# Patient Record
Sex: FEMALE | URBAN - METROPOLITAN AREA
[De-identification: names, ages, dates, MRNs, and addresses within clinical notes are randomized per-mention and may not be internally consistent; named-entity substitution may affect disease eponyms.]

---

## 2020-09-02 ENCOUNTER — OFFICE VISIT (OUTPATIENT)
Dept: URBAN - METROPOLITAN AREA CLINIC 76 | Facility: CLINIC | Age: 40
End: 2020-09-02
Payer: COMMERCIAL

## 2020-09-02 PROCEDURE — 92004 COMPRE OPH EXAM NEW PT 1/>: CPT | Performed by: OPTOMETRIST

## 2020-09-02 ASSESSMENT — KERATOMETRY
OS: 44.75
OD: 44.50

## 2020-09-02 ASSESSMENT — VISUAL ACUITY
OS: 20/30
OD: 20/30

## 2020-09-02 NOTE — IMPRESSION/PLAN
Impression: Myopia, bilateral: H52.13. Bilateral. Plan: A glasses prescription has been discussed and generated; optional.  Patient to call with any concerns.

## 2021-09-21 ENCOUNTER — OFFICE VISIT (OUTPATIENT)
Dept: URBAN - METROPOLITAN AREA CLINIC 76 | Facility: CLINIC | Age: 41
End: 2021-09-21
Payer: COMMERCIAL

## 2021-09-21 DIAGNOSIS — H52.13 MYOPIA, BILATERAL: Primary | ICD-10-CM

## 2021-09-21 PROCEDURE — 92014 COMPRE OPH EXAM EST PT 1/>: CPT | Performed by: OPTOMETRIST

## 2021-09-21 RX ORDER — PREDNISOLONE ACETATE 10 MG/ML
1 % SUSPENSION/ DROPS OPHTHALMIC
Qty: 5 | Refills: 0 | Status: ACTIVE
Start: 2021-09-21

## 2021-09-21 RX ORDER — PREDNISOLONE ACETATE 10 MG/ML
1 % SUSPENSION/ DROPS OPHTHALMIC
Qty: 5 | Refills: 0 | Status: INACTIVE
Start: 2021-09-21 | End: 2021-09-21

## 2021-09-21 ASSESSMENT — INTRAOCULAR PRESSURE: OD: 10

## 2021-09-21 ASSESSMENT — KERATOMETRY
OD: 45.00
OS: 44.63

## 2021-09-21 ASSESSMENT — VISUAL ACUITY
OD: 20/25
OS: 20/25

## 2021-09-21 NOTE — IMPRESSION/PLAN
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 Caregivers noticed red OD 1 month ago, pt did not c/o pain. IOP good OD. Plan: Discussed. Start Pred QID OD, instilled Phenylephrine 10% OD. Continue to monitor, pt/care giver to call with concerns. Discussed AACG symptoms. Call eye clinic ASAP, don't go to urgent care.

## 2021-09-21 NOTE — IMPRESSION/PLAN
Impression: Myopia, bilateral: H52.13. Happy with vision OU. Plan: A glasses prescription has been discussed and generated, pt does not wish to wear glasses. Patient to call with any concerns.

## 2021-09-29 ENCOUNTER — OFFICE VISIT (OUTPATIENT)
Dept: URBAN - METROPOLITAN AREA CLINIC 76 | Facility: CLINIC | Age: 41
End: 2021-09-29
Payer: COMMERCIAL

## 2021-09-29 DIAGNOSIS — H21.541: Primary | ICD-10-CM

## 2021-09-29 PROCEDURE — 99212 OFFICE O/P EST SF 10 MIN: CPT | Performed by: OPTOMETRIST

## 2021-09-29 ASSESSMENT — INTRAOCULAR PRESSURE
OD: 11
OS: 10

## 2021-09-29 NOTE — IMPRESSION/PLAN
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 Improved OD. IOP good OU. Plan: Discussed. Reduce Pred QID OD for 3 more days then d/c, Continue to monitor, pt/care giver to call with concerns. Discussed AACG symptoms. Call eye clinic ASAP, don't go to urgent care. No follow up needed unless symptoms worsen.

## 2022-11-01 ENCOUNTER — OFFICE VISIT (OUTPATIENT)
Dept: URBAN - METROPOLITAN AREA CLINIC 76 | Facility: CLINIC | Age: 42
End: 2022-11-01
Payer: COMMERCIAL

## 2022-11-01 DIAGNOSIS — H21.541: Primary | ICD-10-CM

## 2022-11-01 PROCEDURE — 92014 COMPRE OPH EXAM EST PT 1/>: CPT | Performed by: OPTOMETRIST

## 2022-11-01 ASSESSMENT — INTRAOCULAR PRESSURE
OS: 11
OD: 11

## 2022-11-01 NOTE — IMPRESSION/PLAN
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 Improved OD. IOP good OU. Plan: Continue to monitor, pt/care giver to call with concerns. Discussed AACG symptoms. Call eye clinic ASAP, don't go to urgent care. No follow up needed unless symptoms worsen. Use tropic and phenyl OD. Tropic only OS.

## 2022-12-07 ENCOUNTER — OFFICE VISIT (OUTPATIENT)
Dept: URBAN - METROPOLITAN AREA CLINIC 76 | Facility: CLINIC | Age: 42
End: 2022-12-07
Payer: COMMERCIAL

## 2022-12-07 DIAGNOSIS — H21.541: Primary | ICD-10-CM

## 2022-12-07 PROCEDURE — 99212 OFFICE O/P EST SF 10 MIN: CPT | Performed by: OPTOMETRIST

## 2022-12-07 ASSESSMENT — INTRAOCULAR PRESSURE
OD: 11
OS: 13

## 2022-12-07 NOTE — IMPRESSION/PLAN
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 Stable OD. IOP good OU. Mother wanted reassurance regarding condition, needed more explanation. Plan: Discussed condition in great detail with mother and caretaker. Continue to monitor, pt caregiver to call with concerns. Discussed AACG symptoms. Call eye clinic ASAP, don't go to urgent care. No follow up needed unless symptoms worsen. Advised pupils are different sizes, as expected. Use tropic and phenyl OD. Tropic only OS.

## 2023-11-07 ENCOUNTER — OFFICE VISIT (OUTPATIENT)
Dept: URBAN - METROPOLITAN AREA CLINIC 76 | Facility: CLINIC | Age: 43
End: 2023-11-07
Payer: COMMERCIAL

## 2023-11-07 DIAGNOSIS — H21.541: Primary | ICD-10-CM

## 2023-11-07 PROCEDURE — 92014 COMPRE OPH EXAM EST PT 1/>: CPT | Performed by: OPTOMETRIST

## 2023-11-07 ASSESSMENT — KERATOMETRY
OS: 44.75
OD: 44.63

## 2023-11-07 ASSESSMENT — INTRAOCULAR PRESSURE
OD: 10
OS: 12

## 2023-12-07 ENCOUNTER — OFFICE VISIT (OUTPATIENT)
Dept: URBAN - METROPOLITAN AREA CLINIC 76 | Facility: CLINIC | Age: 43
End: 2023-12-07
Payer: COMMERCIAL

## 2023-12-07 DIAGNOSIS — S05.00XA INJURY OF CONJUNCTIVA W/O FB OF EYE, INITIAL ENCOUNTER: Primary | ICD-10-CM

## 2023-12-07 DIAGNOSIS — H10.45 OTHER CHRONIC ALLERGIC CONJUNCTIVITIS: ICD-10-CM

## 2023-12-07 PROCEDURE — 99213 OFFICE O/P EST LOW 20 MIN: CPT | Performed by: OPTOMETRIST

## 2023-12-07 RX ORDER — OLOPATADINE HYDROCHLORIDE OPHTHALMIC 2 MG/ML
0.2 % SOLUTION OPHTHALMIC
Qty: 15 | Refills: 1 | Status: INACTIVE
Start: 2023-12-07 | End: 2023-12-08

## 2023-12-07 ASSESSMENT — INTRAOCULAR PRESSURE
OS: 12
OD: 11

## 2024-08-16 ENCOUNTER — OFFICE VISIT (OUTPATIENT)
Dept: URBAN - METROPOLITAN AREA CLINIC 76 | Facility: CLINIC | Age: 44
End: 2024-08-16
Payer: COMMERCIAL

## 2024-08-16 DIAGNOSIS — H21.542 POSTERIOR SYNECHIAE (IRIS), LEFT EYE: Primary | ICD-10-CM

## 2024-08-16 PROCEDURE — 99213 OFFICE O/P EST LOW 20 MIN: CPT | Performed by: OPTOMETRIST

## 2024-08-16 RX ORDER — PREDNISOLONE ACETATE 10 MG/ML
1 % SUSPENSION/ DROPS OPHTHALMIC
Qty: 10 | Refills: 0 | Status: ACTIVE
Start: 2024-08-16

## 2024-08-16 RX ORDER — CYCLOPENTOLATE HYDROCHLORIDE 10 MG/ML
1 % SOLUTION/ DROPS OPHTHALMIC
Qty: 5 | Refills: 0 | Status: ACTIVE
Start: 2024-08-16

## 2024-08-16 ASSESSMENT — INTRAOCULAR PRESSURE
OD: 17
OS: 17

## 2024-08-26 ENCOUNTER — OFFICE VISIT (OUTPATIENT)
Dept: URBAN - METROPOLITAN AREA CLINIC 76 | Facility: CLINIC | Age: 44
End: 2024-08-26
Payer: COMMERCIAL

## 2024-08-26 DIAGNOSIS — H21.542 POSTERIOR SYNECHIAE (IRIS), LEFT EYE: Primary | ICD-10-CM

## 2024-08-26 PROCEDURE — 99213 OFFICE O/P EST LOW 20 MIN: CPT

## 2024-08-26 ASSESSMENT — INTRAOCULAR PRESSURE
OS: 12
OD: 13

## 2024-09-09 ENCOUNTER — OFFICE VISIT (OUTPATIENT)
Dept: URBAN - METROPOLITAN AREA CLINIC 76 | Facility: CLINIC | Age: 44
End: 2024-09-09
Payer: COMMERCIAL

## 2024-09-09 DIAGNOSIS — H21.542 POSTERIOR SYNECHIAE (IRIS), LEFT EYE: Primary | ICD-10-CM

## 2024-09-09 DIAGNOSIS — H21.541: ICD-10-CM

## 2024-09-09 PROCEDURE — 99214 OFFICE O/P EST MOD 30 MIN: CPT

## 2024-09-09 RX ORDER — ATROPINE SULFATE 10 MG/ML
1 % SOLUTION OPHTHALMIC
Qty: 5 | Refills: 0 | Status: ACTIVE
Start: 2024-09-09

## 2024-09-09 ASSESSMENT — INTRAOCULAR PRESSURE
OD: 18
OS: 16

## 2024-11-12 ENCOUNTER — OFFICE VISIT (OUTPATIENT)
Dept: URBAN - METROPOLITAN AREA CLINIC 76 | Facility: CLINIC | Age: 44
End: 2024-11-12
Payer: COMMERCIAL

## 2024-11-12 DIAGNOSIS — H10.45 OTHER CHRONIC ALLERGIC CONJUNCTIVITIS: ICD-10-CM

## 2024-11-12 DIAGNOSIS — H21.542 POSTERIOR SYNECHIAE (IRIS), LEFT EYE: Primary | ICD-10-CM

## 2024-11-12 DIAGNOSIS — H21.541: ICD-10-CM

## 2024-11-12 DIAGNOSIS — H52.13 MYOPIA, BILATERAL: ICD-10-CM

## 2024-11-12 PROCEDURE — 99214 OFFICE O/P EST MOD 30 MIN: CPT | Performed by: OPTOMETRIST

## 2024-11-12 ASSESSMENT — INTRAOCULAR PRESSURE
OD: 12
OS: 13